# Patient Record
Sex: FEMALE | Race: AMERICAN INDIAN OR ALASKA NATIVE
[De-identification: names, ages, dates, MRNs, and addresses within clinical notes are randomized per-mention and may not be internally consistent; named-entity substitution may affect disease eponyms.]

---

## 2020-11-28 ENCOUNTER — HOSPITAL ENCOUNTER (EMERGENCY)
Dept: HOSPITAL 43 - DL.ED | Age: 25
Discharge: HOME | End: 2020-11-28
Payer: COMMERCIAL

## 2020-11-28 DIAGNOSIS — U07.1: Primary | ICD-10-CM

## 2020-11-28 DIAGNOSIS — E66.9: ICD-10-CM

## 2020-11-28 DIAGNOSIS — J40: ICD-10-CM

## 2020-11-28 DIAGNOSIS — I10: ICD-10-CM

## 2020-11-28 PROCEDURE — 99283 EMERGENCY DEPT VISIT LOW MDM: CPT

## 2020-11-28 NOTE — EDM.PDOC
ED HPI GENERAL MEDICAL PROBLEM





- General


Chief Complaint: Respiratory Problem


Stated Complaint: COUGH, SHORTNESS OF BREATH


Time Seen by Provider: 11/28/20 22:54


Source of Information: Reports: Patient


History Limitations: Reports: No Limitations





- History of Present Illness


INITIAL COMMENTS - FREE TEXT/NARRATIVE: 





pos covid x 10 days not treated with anything, started having dry cough. denies 

F/C





- Related Data


                                    Allergies











Allergy/AdvReac Type Severity Reaction Status Date / Time


 


No Known Allergies Allergy   Verified 07/26/18 21:16











Home Meds: 


                                    Home Meds





Levothyroxine [Synthroid] 50 mcg PO ACBRK 12/25/13 [History]


Blood Pressure Medication 1 tab PO DAILY 07/26/18 [History]


Cholecalciferol (Vitamin D3) [Vitamin D] 1 tab PO DAILY 07/26/18 [History]











Past Medical History





- Past Health History


Medical/Surgical History: Denies Medical/Surgical History


HEENT History: Reports: None


Cardiovascular History: Reports: Hypertension


Respiratory History: Reports: None


Gastrointestinal History: Reports: None


Genitourinary History: Reports: None


OB/GYN History: Reports: None


Musculoskeletal History: Reports: None


Neurological History: Reports: None


Psychiatric History: Reports: None


Endocrine/Metabolic History: Reports: Hyperthyroidism, Obesity/BMI 30+, Vitamin 

D Deficiency


Hematologic History: Reports: None


Immunologic History: Reports: None


Oncologic (Cancer) History: Reports: None


Dermatologic History: Reports: None





ED ROS GENERAL





- Review of Systems


Review Of Systems: Comprehensive ROS is negative, except as noted in HPI.





ED EXAM, GENERAL





- Physical Exam


Exam: See Below


Exam Limited By: No Limitations


General Appearance: Alert, WD/WN, Mild Distress, Other (COUGH SPASMS)


Ears: Hearing Grossly Normal


Throat/Mouth: Normal Voice, No Airway Compromise


Head: Atraumatic


Neck: Non-Tender, Full Range of Motion


Respiratory/Chest: No Accessory Muscle Use, Rhonchi.  No: Decreased Breath 

Sounds


Cardiovascular: Regular Rate, Rhythm


GI/Abdominal: Soft, Non-Tender


 (Female) Exam: Deferred


Rectal (Female) Exam: Deferred


Neurological: Alert, Oriented, Normal Cognition, Normal Gait, No Motor/Sensory 

Deficits


Psychiatric: Flat Affect


Skin Exam: Warm, Dry, Normal Color


Lymphatic: No Adenopathy





Course





- Orders/Labs/Meds


Orders: 





                               Active Orders 24 hr











 Category Date Time Status


 


 RT Post Treatment Assessment [RC] Click to Edit Care  11/28/20 22:53 Ordered


 


 RT Pre-Treatment Assessment [RC] Click to Edit Care  11/28/20 22:53 Ordered











Meds: 





Medications














Discontinued Medications














Generic Name Dose Route Start Last Admin





  Trade Name Frandy  PRN Reason Stop Dose Admin


 


Albuterol  6.7 gm  11/28/20 22:53 





  Proventil Hfa  INH  11/28/20 22:54 





  ONETIME ONE  


 


Prednisone  20 mg  11/28/20 22:53 





  Prednisone  PO  11/28/20 22:54 





  ONETIME ONE  














Departure





- Departure


Time of Disposition: 22:56


Disposition: Home, Self-Care 01


Condition: Good


Clinical Impression: 


 Bronchitis due to 2019-nCoV








- Discharge Information


Instructions:  COVID-19 Frequently Asked Questions


Additional Instructions: 


1) take 1 to 2 puffs of inhaler 3 to 4 times daily for cough


2) drink lots of liquids


3) take tylenol or motrin as needed for fever and aches


4) follow up at clinic





rx given;


medrol dospak





- My Orders


Last 24 Hours: 





My Active Orders





11/28/20 22:53


RT Post Treatment Assessment [RC] Click to Edit 


RT Pre-Treatment Assessment [RC] Click to Edit 














- Assessment/Plan


Last 24 Hours: 





My Active Orders





11/28/20 22:53


RT Post Treatment Assessment [RC] Click to Edit 


RT Pre-Treatment Assessment [RC] Click to Edit

## 2021-09-18 ENCOUNTER — HOSPITAL ENCOUNTER (EMERGENCY)
Dept: HOSPITAL 43 - DL.ED | Age: 26
Discharge: HOME | End: 2021-09-18
Payer: COMMERCIAL

## 2021-09-18 DIAGNOSIS — E03.9: ICD-10-CM

## 2021-09-18 DIAGNOSIS — E66.9: ICD-10-CM

## 2021-09-18 DIAGNOSIS — F41.9: Primary | ICD-10-CM

## 2021-09-18 DIAGNOSIS — I10: ICD-10-CM

## 2021-09-18 DIAGNOSIS — Z79.899: ICD-10-CM

## 2021-09-18 LAB
AMPHET UR QL SCN: NEGATIVE
AMPHET UR QL SCN: NEGATIVE
AMPHETAMINES UR QL SCN>500 NG/ML: NEGATIVE
ANION GAP SERPL CALC-SCNC: 11.7 MEQ/L (ref 7–13)
BARBITURATES UR QL SCN: NEGATIVE
CHLORIDE SERPL-SCNC: 104 MMOL/L (ref 98–107)
MDMA UR QL SCN: NEGATIVE
OXYCODONE UR QL SCN: NEGATIVE
PCP UR QL SCN: NEGATIVE
SODIUM SERPL-SCNC: 139 MMOL/L (ref 136–145)
TRICYCLICS UR QL SCN: NEGATIVE

## 2021-09-18 PROCEDURE — 80305 DRUG TEST PRSMV DIR OPT OBS: CPT

## 2021-09-18 PROCEDURE — 84484 ASSAY OF TROPONIN QUANT: CPT

## 2021-09-18 PROCEDURE — 80053 COMPREHEN METABOLIC PANEL: CPT

## 2021-09-18 PROCEDURE — 36415 COLL VENOUS BLD VENIPUNCTURE: CPT

## 2021-09-18 PROCEDURE — 96375 TX/PRO/DX INJ NEW DRUG ADDON: CPT

## 2021-09-18 PROCEDURE — 85025 COMPLETE CBC W/AUTO DIFF WBC: CPT

## 2021-09-18 PROCEDURE — 99285 EMERGENCY DEPT VISIT HI MDM: CPT

## 2021-09-18 PROCEDURE — 80307 DRUG TEST PRSMV CHEM ANLYZR: CPT

## 2021-09-18 PROCEDURE — 71045 X-RAY EXAM CHEST 1 VIEW: CPT

## 2021-09-18 PROCEDURE — 93005 ELECTROCARDIOGRAM TRACING: CPT

## 2021-09-18 PROCEDURE — 96374 THER/PROPH/DIAG INJ IV PUSH: CPT

## 2021-09-18 RX ADMIN — SODIUM CHLORIDE ONE MG: 9 INJECTION, SOLUTION INTRAVENOUS at 03:08

## 2021-09-18 RX ADMIN — Medication PRN ML: at 01:34

## 2021-09-18 NOTE — CR
PROCEDURE INFORMATION: 

Exam: XR Chest 

Exam date and time: 9/18/2021 1:50 AM 

Age: 26 years old 

Clinical indication: Shortness of breath; Additional info: Heart racing, 

shortness of breath 



TECHNIQUE: 

Imaging protocol: XR of the chest. 

Views: 1 view. 



COMPARISON: 

CR CHEST PA/LAT 6/22/2013 12:24 AM 



FINDINGS: 

Lungs: Unremarkable. No consolidation. 

Pleural spaces: Unremarkable. No pleural effusion. No pneumothorax. 

Heart/Mediastinum: Unremarkable. No cardiomegaly. 

Bones/joints: Unremarkable. 



IMPRESSION: 

No acute findings.

## 2021-09-18 NOTE — EDM.PDOC
ED HPI GENERAL MEDICAL PROBLEM





- General


Chief Complaint: Respiratory Problem


Stated Complaint: TROUBLE BREATHING


Time Seen by Provider: 09/18/21 01:06


Source of Information: Reports: Patient





- History of Present Illness


INITIAL COMMENTS - FREE TEXT/NARRATIVE: 


Pt is here for shortness of breath and feeling like her heart is racing. It 

started earlier this evening while she was getting ready for bed. She tried 

taking one of her anxiety pills, but it didn't seem to help. She is very worried

that something is wrong and cannot get her symptoms under control. No known 

cardiac history. Never had this before. No recent illnesses. She reports she is 

drinking plenty of water and other fluids. 





  ** Chest


Pain Score (Numeric/FACES): 4





- Related Data


                                    Allergies











Allergy/AdvReac Type Severity Reaction Status Date / Time


 


No Known Allergies Allergy   Verified 07/26/18 21:16











Home Meds: 


                                    Home Meds





Levothyroxine [Synthroid] 50 mcg PO ACBRK 12/25/13 [History]


Blood Pressure Medication 1 tab PO DAILY 07/26/18 [History]


Cholecalciferol (Vitamin D3) [Vitamin D] 1 tab PO DAILY 07/26/18 [History]











Past Medical History





- Past Health History


Medical/Surgical History: Denies Medical/Surgical History


HEENT History: Reports: None


Cardiovascular History: Reports: Hypertension


Respiratory History: Reports: None


Gastrointestinal History: Reports: None


Genitourinary History: Reports: None


OB/GYN History: Reports: None


Musculoskeletal History: Reports: None


Neurological History: Reports: None


Psychiatric History: Reports: None


Endocrine/Metabolic History: Reports: Hyperthyroidism, Obesity/BMI 30+, Vitamin 

D Deficiency


Hematologic History: Reports: None


Immunologic History: Reports: None


Oncologic (Cancer) History: Reports: None


Dermatologic History: Reports: None





- Infectious Disease History


Infectious Disease History: Reports: Other (See Below)


Other Infectious Disease History: covid





Social & Family History





- Family History


Family Medical History: No Pertinent Family History





- Caffeine Use


Caffeine Use: Reports: Coffee





ED ROS GENERAL





- Review of Systems


Review Of Systems: Comprehensive ROS is negative, except as noted in HPI.





ED EXAM, GENERAL





- Physical Exam


Exam: See Below


General Appearance: Alert, WD/WN, Anxious


Eye Exam: Bilateral Eye: Normal Inspection


Ears: Normal External Exam, Hearing Grossly Normal


Throat/Mouth: Normal Voice, No Airway Compromise


Head: Atraumatic, Normocephalic


Neck: Supple, Non-Tender


Respiratory/Chest: No Respiratory Distress, Lungs Clear, Normal Breath Sounds, 

No Accessory Muscle Use, Chest Non-Tender


Cardiovascular: Normal Peripheral Pulses, No Murmur, Tachycardia


GI/Abdominal: Soft, Non-Tender, No Distention


 (Female) Exam: Deferred


Rectal (Female) Exam: Deferred


Back Exam: Normal Inspection, Full Range of Motion


Extremities: Normal Inspection, Normal Range of Motion, Normal Capillary Refill


Neurological: Alert, Oriented, Normal Cognition, No Motor/Sensory Deficits


Psychiatric: Normal Affect, Anxious


Skin Exam: Warm, Dry, Intact, Normal Color, No Rash


  ** #1 Interpretation


EKG Date: 09/18/21


Rhythm: NSR (sinus tachycardia)


Axis: Normal


P-Wave: Present


QRS: Normal


ST-T: Normal


QT: Normal





Course





- Vital Signs


Last Recorded V/S: 


                                Last Vital Signs











Temp  97.2 F   09/18/21 01:03


 


Pulse  125 H  09/18/21 01:03


 


Resp  17   09/18/21 01:03


 


BP  157/92 H  09/18/21 01:03


 


Pulse Ox  99   09/18/21 01:03














- Orders/Labs/Meds


Orders: 


                               Active Orders 24 hr











 Category Date Time Status


 


 Peripheral IV Care [RC] .AS DIRECTED Care  09/18/21 01:11 Ordered


 


 Sodium Chloride 0.9% [Saline Flush] Med  09/18/21 01:10 Ordered





 10 ml FLUSH ASDIRECTED PRN   


 


 Peripheral IV Insertion Adult [OM.PC] Stat Oth  09/18/21 01:09 Ordered








                                Medication Orders





Sodium Chloride (Sodium Chloride 0.9% 10 Ml Syringe)  10 ml FLUSH ASDIRECTED PRN


   PRN Reason: Keep Vein Open


   Last Admin: 09/18/21 01:34  Dose: 10 ml


   Documented by: SANDI








Labs: 


                                Laboratory Tests











  09/18/21 09/18/21 09/18/21 Range/Units





  01:25 01:25 01:25 


 


WBC  14.7 H    (5.0-10.0)  10^3/uL


 


RBC  5.20    (4.2-5.4)  10^6/uL


 


Hgb  13.3    (12.0-16.0)  g/dL


 


Hct  41.2    (37.0-47.0)  %


 


MCV  79.2 L    ()  fL


 


MCH  25.6 L    (27.0-34.0)  pg


 


MCHC  32.3 L    (33.0-35.0)  g/dL


 


Plt Count  421    (150-450)  10^3/uL


 


Neut % (Auto)  73.5    (42.2-75.2)  %


 


Lymph % (Auto)  18.4 L    (20.5-50.1)  %


 


Mono % (Auto)  5.6    (2-8)  %


 


Eos % (Auto)  2.4    (1.0-3.0)  %


 


Baso % (Auto)  0.1    (0.0-1.0)  %


 


Sodium   139   (136-145)  mmol/L


 


Potassium   3.7   (3.5-5.1)  mmol/L


 


Chloride   104   ()  mmol/L


 


Carbon Dioxide   27   (21-32)  mmol/L


 


Anion Gap   11.7   (7-13)  mEq/L


 


BUN   13   (7-18)  mg/dL


 


Creatinine   0.85   (0.55-1.02)  mg/dL


 


Est Cr Clr Drug Dosing   113.71   mL/min


 


Estimated GFR (MDRD)   > 60   


 


BUN/Creatinine Ratio   15.3   (No establ ref range)  


 


Glucose   132 H   (70-99)  mg/dL


 


Calcium   9.0   (8.5-10.1)  mg/dL


 


Total Bilirubin   0.2   (0.2-1.0)  mg/dL


 


AST   12 L   (15-37)  U/L


 


ALT   23   (14-59)  U/L


 


Alkaline Phosphatase   85   ()  U/L


 


Troponin I High Sens    4  (<=51)  pg/mL


 


Total Protein   8.0   (6.4-8.2)  g/dL


 


Albumin   3.6   (3.4-5.0)  g/dL


 


Globulin   4.4   


 


Albumin/Globulin Ratio   0.8   


 


Urine Opiates Screen     (NEGATIVE)  


 


Ur Oxycodone Screen     (NEGATIVE)  


 


Urine Methadone Screen     (NEGATIVE)  


 


Ur Barbiturates Screen     (NEGATIVE)  


 


U Tricyclic Antidepress     (NEGATIVE)  


 


Ur Phencyclidine Scrn     (NEGATIVE)  


 


Ur Amphetamine Screen     (NEGATIVE)  


 


U Methamphetamines Scrn     (NEGATIVE)  


 


Urine MDMA Screen     (NEGATIVE)  


 


U Benzodiazepines Scrn     (NEGATIVE)  


 


Urine Cocaine Screen     (NEGATIVE)  


 


U Marijuana (THC) Screen     (NEGATIVE)  


 


Ethyl Alcohol   < 3   (0)  mg/dL














  09/18/21 Range/Units





  02:08 


 


WBC   (5.0-10.0)  10^3/uL


 


RBC   (4.2-5.4)  10^6/uL


 


Hgb   (12.0-16.0)  g/dL


 


Hct   (37.0-47.0)  %


 


MCV   ()  fL


 


MCH   (27.0-34.0)  pg


 


MCHC   (33.0-35.0)  g/dL


 


Plt Count   (150-450)  10^3/uL


 


Neut % (Auto)   (42.2-75.2)  %


 


Lymph % (Auto)   (20.5-50.1)  %


 


Mono % (Auto)   (2-8)  %


 


Eos % (Auto)   (1.0-3.0)  %


 


Baso % (Auto)   (0.0-1.0)  %


 


Sodium   (136-145)  mmol/L


 


Potassium   (3.5-5.1)  mmol/L


 


Chloride   ()  mmol/L


 


Carbon Dioxide   (21-32)  mmol/L


 


Anion Gap   (7-13)  mEq/L


 


BUN   (7-18)  mg/dL


 


Creatinine   (0.55-1.02)  mg/dL


 


Est Cr Clr Drug Dosing   mL/min


 


Estimated GFR (MDRD)   


 


BUN/Creatinine Ratio   (No establ ref range)  


 


Glucose   (70-99)  mg/dL


 


Calcium   (8.5-10.1)  mg/dL


 


Total Bilirubin   (0.2-1.0)  mg/dL


 


AST   (15-37)  U/L


 


ALT   (14-59)  U/L


 


Alkaline Phosphatase   ()  U/L


 


Troponin I High Sens   (<=51)  pg/mL


 


Total Protein   (6.4-8.2)  g/dL


 


Albumin   (3.4-5.0)  g/dL


 


Globulin   


 


Albumin/Globulin Ratio   


 


Urine Opiates Screen  Negative  (NEGATIVE)  


 


Ur Oxycodone Screen  Negative  (NEGATIVE)  


 


Urine Methadone Screen  Negative  (NEGATIVE)  


 


Ur Barbiturates Screen  Negative  (NEGATIVE)  


 


U Tricyclic Antidepress  Negative  (NEGATIVE)  


 


Ur Phencyclidine Scrn  Negative  (NEGATIVE)  


 


Ur Amphetamine Screen  Negative  (NEGATIVE)  


 


U Methamphetamines Scrn  Negative  (NEGATIVE)  


 


Urine MDMA Screen  Negative  (NEGATIVE)  


 


U Benzodiazepines Scrn  Negative  (NEGATIVE)  


 


Urine Cocaine Screen  Negative  (NEGATIVE)  


 


U Marijuana (THC) Screen  Positive H  (NEGATIVE)  


 


Ethyl Alcohol   (0)  mg/dL











Meds: 


Medications











Generic Name Dose Route Start Last Admin





  Trade Name Freq  PRN Reason Stop Dose Admin


 


Sodium Chloride  10 ml  09/18/21 01:10  09/18/21 01:34





  Sodium Chloride 0.9% 10 Ml Syringe  FLUSH   10 ml





  ASDIRECTED PRN   Administration





  Keep Vein Open  














Discontinued Medications














Generic Name Dose Route Start Last Admin





  Trade Name Frandy  PRN Reason Stop Dose Admin


 


Sodium Chloride  1,000 mls @ 999 mls/hr  09/18/21 01:10  09/18/21 01:33





  Normal Saline  IV  09/18/21 02:10  999 mls/hr





  .BOLUS ONE   Administration


 


Labetalol HCl  10 mg  09/18/21 01:10  09/18/21 01:33





  Labetalol 20 Mg/4 Ml Syringe  IVPUSH  09/18/21 01:11  10 mg





  ONETIME ONE   Administration


 


Lorazepam  0.5 mg  09/18/21 02:55  09/18/21 03:08





  Lorazepam 2 Mg/Ml Sdv  IVPUSH  09/18/21 02:56  0.5 mg





  ONETIME ONE   Administration














- Re-Assessments/Exams


Free Text/Narrative Re-Assessment/Exam: 


Pt is feeling better and is asking about going home. Reviewed reasons to 

call/return to the ER. Pt verbalized understanding. 


09/18/21 03:43








Departure





- Departure


Time of Disposition: 03:44


Disposition: Home, Self-Care 01


Condition: Fair


Clinical Impression: 


 Anxiety attack








- Discharge Information


*PRESCRIPTION DRUG MONITORING PROGRAM REVIEWED*: Not Applicable


*COPY OF PRESCRIPTION DRUG MONITORING REPORT IN PATIENT [place]: Not Applicable


Instructions:  Managing Anxiety, Adult


Forms:  ED Department Discharge


Additional Instructions: 


Continue taking your regularly prescribed medications


If you develop worsening chest pain or palpitations, call/return to the ER


Follow up with your primary care provider in 3-5 days





Sepsis Event Note (ED)





- Focused Exam


Vital Signs: 


                                   Vital Signs











  Temp Pulse Resp BP Pulse Ox


 


 09/18/21 01:03  97.2 F  125 H  17  157/92 H  99














- My Orders


Last 24 Hours: 


My Active Orders





09/18/21 01:09


Peripheral IV Insertion Adult [OM.PC] Stat 





09/18/21 01:10


Sodium Chloride 0.9% [Saline Flush]   10 ml FLUSH ASDIRECTED PRN 





09/18/21 01:11


Peripheral IV Care [RC] .AS DIRECTED 














- Assessment/Plan


Last 24 Hours: 


My Active Orders





09/18/21 01:09


Peripheral IV Insertion Adult [OM.PC] Stat 





09/18/21 01:10


Sodium Chloride 0.9% [Saline Flush]   10 ml FLUSH ASDIRECTED PRN 





09/18/21 01:11


Peripheral IV Care [RC] .AS DIRECTED

## 2022-11-22 ENCOUNTER — HOSPITAL ENCOUNTER (EMERGENCY)
Dept: HOSPITAL 43 - DL.ED | Age: 27
Discharge: HOME | End: 2022-11-22
Payer: COMMERCIAL

## 2022-11-22 DIAGNOSIS — Z79.899: ICD-10-CM

## 2022-11-22 DIAGNOSIS — I10: ICD-10-CM

## 2022-11-22 DIAGNOSIS — B34.9: Primary | ICD-10-CM
